# Patient Record
(demographics unavailable — no encounter records)

---

## 2024-11-08 NOTE — PHYSICAL EXAM
[Chaperone Present] : A chaperone was present in the examining room during all aspects of the physical examination [Appropriately responsive] : appropriately responsive [Alert] : alert [No Acute Distress] : no acute distress [No Lymphadenopathy] : no lymphadenopathy [Soft] : soft [Non-tender] : non-tender [Non-distended] : non-distended [No HSM] : No HSM [No Lesions] : no lesions [No Mass] : no mass [Oriented x3] : oriented x3 [Examination Of The Breasts] : a normal appearance [No Masses] : no breast masses were palpable [Labia Majora] : normal [Labia Minora] : normal [Normal] : normal [Uterine Adnexae] : non-palpable [FreeTextEntry5] : anteriorly displaced and small

## 2024-11-08 NOTE — PLAN
[FreeTextEntry1] : linden v extensively wt loss options--risk inc CVA,MI,DM,uterine ca as well as QOL affected and mobidity inc

## 2024-11-08 NOTE — PROCEDURE
[Endometrial Biopsy] : Endometrial biopsy [Time out performed] : Pre-procedure time out performed.  Patient's name, date of birth and procedure confirmed. [Consent Obtained] : Consent obtained [Post-Menop. Bleeding] : post-menopausal bleeding [Risks] : risks [Benefits] : benefits [Alternatives] : alternatives [Patient] : patient [Infection] : infection [Bleeding] : bleeding [Allergic Reaction] : allergic reaction [Uterine Perforation] : uterine perforation [Pain] : pain [Negative] : negative pregnancy test [Betadine] : Betadine [None] : none [Required Dilation] : required dilation [Anteverted] : anteverted [Scant] : scant [Tolerated Well] : Patient tolerated the procedure well [No Complications] : No complications [de-identified] : allis

## 2024-11-08 NOTE — HISTORY OF PRESENT ILLNESS
[FreeTextEntry1] : pt nearly 300l lbs--had lost 45--but 15 yo daughter dxed July 2023 thyroid ca--Sznyter--throidectomy and RI--pos nodes [Mammogramdate] : 2023 [BreastSonogramDate] : 2023 [PapSmeardate] : 2021 [ColonoscopyDate] : 2018